# Patient Record
Sex: FEMALE | Race: WHITE | NOT HISPANIC OR LATINO | Employment: FULL TIME | URBAN - METROPOLITAN AREA
[De-identification: names, ages, dates, MRNs, and addresses within clinical notes are randomized per-mention and may not be internally consistent; named-entity substitution may affect disease eponyms.]

---

## 2024-05-29 ENCOUNTER — OFFICE VISIT (OUTPATIENT)
Dept: OBGYN CLINIC | Facility: CLINIC | Age: 19
End: 2024-05-29
Payer: COMMERCIAL

## 2024-05-29 VITALS
HEIGHT: 70 IN | WEIGHT: 235.2 LBS | BODY MASS INDEX: 33.67 KG/M2 | SYSTOLIC BLOOD PRESSURE: 110 MMHG | DIASTOLIC BLOOD PRESSURE: 68 MMHG

## 2024-05-29 DIAGNOSIS — Z01.419 ROUTINE GYNECOLOGICAL EXAMINATION: Primary | ICD-10-CM

## 2024-05-29 PROCEDURE — S0610 ANNUAL GYNECOLOGICAL EXAMINA: HCPCS | Performed by: NURSE PRACTITIONER

## 2024-05-29 RX ORDER — NORETHINDRONE ACETATE AND ETHINYL ESTRADIOL 1.5-30(21)
1 KIT ORAL DAILY
COMMUNITY
End: 2024-05-29 | Stop reason: SDUPTHER

## 2024-05-29 RX ORDER — NORETHINDRONE ACETATE AND ETHINYL ESTRADIOL 1.5-30(21)
1 KIT ORAL DAILY
Qty: 90 TABLET | Refills: 3 | Status: SHIPPED | OUTPATIENT
Start: 2024-05-29

## 2024-05-29 NOTE — PROGRESS NOTES
[facility] - [place] [id number] [place] [address]    ASSESSMENT/PLAN: Shirlene Alegre is a 19 y.o. No obstetric history on file. who presents for annual gynecologic exam.    Encounter for routine gynecologic examination  - Routine well woman exam completed today.  - Cervical Cancer Screening: Current ASCCP Guidelines reviewed. Last Pap: None. History of abnormal: No  - HPV Vaccination status: Not immunized  - STI screening offered including HIV testing: GC/CT done, others declined  - Contraceptive counseling discussed.  Current contraception: abstinence, oral contraceptives (estrogen/progesterone):   - The following were reviewed in today's visit: breast self exam, exercise, and healthy diet    Additional problems addressed during this visit:  1. Routine gynecological examination  Normal gyn exam  Continue current oral contraceptive   Return visit 1 year and prn    CC:  Annual Gynecologic Examination    HPI: Shirlene Alegre is a 19 y.o. No obstetric history on file. who presents for annual gynecologic examination. She has no complaints today. She is happy with her OCP and wants to continue. She is not currently sexually active, but has been in the past. She sees her family doctor for routine care.     ROS: Negative except as noted in HPI    Patient's last menstrual period was 05/19/2024 (exact date).       She  reports that she is not currently sexually active.       The following portions of the patient's history were reviewed and updated as appropriate:   Past Medical History:   Diagnosis Date    Depression      History reviewed. No pertinent surgical history.  Family History   Problem Relation Age of Onset    No Known Problems Mother     Hypertension Father     No Known Problems Maternal Grandmother     No Known Problems Maternal Grandfather     No Known Problems Paternal Grandmother     Diabetes Paternal Grandfather      Social History     Socioeconomic History    Marital status: Single     Spouse  "name: None    Number of children: None    Years of education: None    Highest education level: None   Occupational History    None   Tobacco Use    Smoking status: Never    Smokeless tobacco: Never   Vaping Use    Vaping status: Never Used   Substance and Sexual Activity    Alcohol use: Yes    Drug use: Never    Sexual activity: Not Currently   Other Topics Concern    None   Social History Narrative    None     Social Determinants of Health     Financial Resource Strain: Not on file   Food Insecurity: Not on file   Transportation Needs: Not on file   Physical Activity: Not on file   Stress: Not on file   Social Connections: Not on file   Intimate Partner Violence: Not on file   Housing Stability: Not on file     Outpatient Medications Marked as Taking for the 5/29/24 encounter (Office Visit) with ROSARIO Hagen   Medication    Multiple Vitamin (MULTIVITAMIN ADULT PO)    norethindrone-ethinyl estradiol-iron (MICROGESTIN FE1.5/30) 1.5-30 MG-MCG tablet     No Known Allergies        Objective:  /68 (BP Location: Left arm, Patient Position: Sitting, Cuff Size: Large)   Ht 5' 10.25\" (1.784 m)   Wt 107 kg (235 lb 3.2 oz)   LMP 05/19/2024 (Exact Date)   BMI 33.51 kg/m²        Chaperone present? Declines.    General Appearance: alert and oriented, in no acute distress.   Neck/Thyroid: No thyromegaly, no thyroid nodules.  Respiratory: Unlabored breathing.  Cardiovascular: No peripheral edema.  Abdomen: Soft, non-tender, non-distended, no masses, no rebound or guarding.  Breast Exam: No dimpling, nipple retraction or discharge. No lumps or masses. No axillary or supraclavicular nodes.   Pelvic:       External genitalia: Normal appearance, no abnormal pigmentation, no lesions or masses. Normal Bartholin's and Munising's.      Urinary system: Urethral meatus normal, bladder non-tender.      Vaginal: normal mucosa without prolapse or lesions. Normal-appearing physiologic discharge.      Cervix: Normal-appearing, " well-epithelialized, no gross lesions or masses. No cervical motion tenderness.      Adnexa: No adnexal masses or tenderness noted.      Uterus: Normal-sized, regular contour, midline, mobile, no uterine tenderness.  Extremities: Normal range of motion. Warm, well-perfused, non-tender.   Skin: normal, no rash or abnormalities  Neurologic: alert, oriented x3  Psychiatric: Appropriate affect, mood stable, cooperative with exam.        ROSARIO Hagen  5/29/2024 11:28 AM

## 2024-06-02 LAB
C TRACH RRNA SPEC QL NAA+PROBE: NOT DETECTED
CLINICAL INFO: NORMAL
CYTO CVX: NORMAL
CYTOLOGY CMNT CVX/VAG CYTO-IMP: NORMAL
DATE PREVIOUS BX: NORMAL
LMP START DATE: NORMAL
N GONORRHOEA RRNA SPEC QL NAA+PROBE: NOT DETECTED
SL AMB PREV. PAP:: NORMAL
SPECIMEN SOURCE CVX/VAG CYTO: NORMAL

## 2025-05-02 DIAGNOSIS — Z01.419 ROUTINE GYNECOLOGICAL EXAMINATION: ICD-10-CM

## 2025-05-02 RX ORDER — NORETHINDRONE ACETATE AND ETHINYL ESTRADIOL 1.5-30(21)
1 KIT ORAL DAILY
Qty: 84 TABLET | Refills: 1 | Status: SHIPPED | OUTPATIENT
Start: 2025-05-02

## 2025-07-01 ENCOUNTER — OFFICE VISIT (OUTPATIENT)
Dept: OBGYN CLINIC | Facility: CLINIC | Age: 20
End: 2025-07-01
Payer: COMMERCIAL

## 2025-07-01 VITALS
BODY MASS INDEX: 34.12 KG/M2 | SYSTOLIC BLOOD PRESSURE: 122 MMHG | HEIGHT: 71 IN | DIASTOLIC BLOOD PRESSURE: 76 MMHG | WEIGHT: 243.7 LBS

## 2025-07-01 DIAGNOSIS — Z01.419 ROUTINE GYNECOLOGICAL EXAMINATION: Primary | ICD-10-CM

## 2025-07-01 PROCEDURE — S0612 ANNUAL GYNECOLOGICAL EXAMINA: HCPCS | Performed by: NURSE PRACTITIONER

## 2025-07-01 RX ORDER — NORETHINDRONE ACETATE AND ETHINYL ESTRADIOL 1.5-30(21)
1 KIT ORAL DAILY
Qty: 84 TABLET | Refills: 2 | Status: SHIPPED | OUTPATIENT
Start: 2025-07-01

## 2025-07-01 RX ORDER — LORATADINE 10 MG/1
TABLET ORAL
COMMUNITY
Start: 2025-03-01

## 2025-07-01 NOTE — PROGRESS NOTES
"Annual Wellness Visit  Name: Shirlene Alegre      : 2005      MRN: [id number]  Encounter Provider: ROSARIO Hagen  Encounter Date: 2025   Encounter department: [facility]    20 y.o.  yo presents today for her annual exam.:  Assessment & Plan  Routine gynecological examination  Normal exam, doing well  Continue current oral contraceptive  Return visit 1 year  Orders:    norethindrone-ethinyl estradiol-iron ([place] Fe 1.530) 1.5-30 MG-MCG tablet; Take 1 tablet by mouth daily             History of Present Illness     Shirlene Alegre is a 20 y.o. female who presents for annual well woman exam. Doing well, no complaints. Happy with current birth control and wants to continue.     GYN:  Denies vaginal discharge, labial erythema or lesions, dyspareunia.  Menses are regular, q 28 days, lasting 4-5 days.  Contraception: oral contraceptive.  Patient is not sexually active.   No gynecologic surgeries.  Gardasil Not complete    OB:   female  Pregnancies were n/a.    :  Denie dysuria, urinary frequency or urgency.   hematuria, flank pain, incontinence.   constipation, diarrhea    Breast:   breast mass, skin changes, dimpling, reddening, nipple retraction.   breast discharge.  Patient does not have a family history of breast, endometrial, or ovarian ca.     General:  Diet: Reviewed dietary calcium recommendations  Exercise: Reviewed recommendation of 150 minutes of moderate intensity exercise per week  ETOH use: social  Tobacco use: no  Recreational drug use:     Screening:  Cervical cancer: last pap smear in . Results were NIL.  Breast cancer: last mammogram in n/a. Results were n/a.  Colon cancer: last colonoscopy in n/a. Results were n/a.  STD screening: declines.      Review of Systems as per HPI       Objective   /76 (BP Location: Left arm, Patient Position: Sitting, Cuff Size: Standard)   Ht 5' 11\" (1.803 m)   Wt 111 kg (243 lb 11.2 oz)   LMP " 06/13/2025 (Exact Date)   Breastfeeding No   BMI 33.99 kg/m²      Physical Exam  Vitals and nursing note reviewed.   Constitutional:       General: She is not in acute distress.     Appearance: She is well-developed.   HENT:      Head: Normocephalic and atraumatic.     Eyes:      Conjunctiva/sclera: Conjunctivae normal.       Cardiovascular:      Rate and Rhythm: Normal rate and regular rhythm.      Heart sounds: No murmur heard.  Pulmonary:      Effort: Pulmonary effort is normal. No respiratory distress.      Breath sounds: Normal breath sounds.   Chest:   Breasts:     Right: Normal. No mass or skin change.      Left: Normal. No mass or skin change.   Abdominal:      Palpations: Abdomen is soft.      Tenderness: There is no abdominal tenderness.   Genitourinary:     General: Normal vulva.      Labia:         Right: No lesion.         Left: No lesion.       Urethra: No prolapse or urethral lesion.      Vagina: Normal. No vaginal discharge, bleeding or lesions.      Cervix: Normal. No cervical motion tenderness, lesion or cervical bleeding.      Uterus: Normal. Not enlarged and not tender.       Adnexa: Right adnexa normal and left adnexa normal.        Right: No mass or tenderness.          Left: No mass or tenderness.        Rectum: Normal.     Musculoskeletal:         General: No swelling.      Cervical back: Neck supple.     Skin:     General: Skin is warm and dry.      Capillary Refill: Capillary refill takes less than 2 seconds.     Neurological:      Mental Status: She is alert.     Psychiatric:         Mood and Affect: Mood normal.